# Patient Record
Sex: MALE | Race: WHITE
[De-identification: names, ages, dates, MRNs, and addresses within clinical notes are randomized per-mention and may not be internally consistent; named-entity substitution may affect disease eponyms.]

---

## 2019-12-09 LAB
ANION GAP SERPL CALC-SCNC: 15.2 MMOL/L (ref 8–16)
APTT BLD: 51.1 SECONDS (ref 22.8–39.4)
BASOPHILS NFR BLD AUTO: 0.3 % (ref 0–2)
BUN SERPL-MCNC: 44 MG/DL (ref 7–18)
CALCIUM SERPL-MCNC: 9.1 MG/DL (ref 8.5–10.1)
CHLORIDE SERPL-SCNC: 107 MMOL/L (ref 98–107)
CO2 SERPL-SCNC: 25.7 MMOL/L (ref 21–32)
CREAT SERPL-MCNC: 3.8 MG/DL (ref 0.6–1.3)
EOSINOPHIL NFR BLD: 2.9 % (ref 0–7)
ERYTHROCYTE [DISTWIDTH] IN BLOOD BY AUTOMATED COUNT: 13.5 % (ref 11.5–14.5)
GLUCOSE SERPL-MCNC: 102 MG/DL (ref 74–106)
HCT VFR BLD CALC: 36.4 % (ref 42–54)
HGB BLD-MCNC: 11.9 G/DL (ref 13.5–17.5)
IMM GRANULOCYTES NFR BLD: 0.3 % (ref 0–5)
INR PPP: 1.22 (ref 0.85–1.17)
LYMPHOCYTES NFR BLD AUTO: 8 % (ref 15–50)
MCH RBC QN AUTO: 30.2 PG (ref 26–34)
MCHC RBC AUTO-ENTMCNC: 32.7 G/DL (ref 31–37)
MCV RBC: 92.4 FL (ref 80–100)
MONOCYTES NFR BLD: 8.6 % (ref 2–11)
NEUTROPHILS NFR BLD AUTO: 79.9 % (ref 40–80)
OSMOLALITY SERPL CALC.SUM OF ELEC: 295 MOSM/KG (ref 275–300)
PLATELET # BLD: 185 10X3/UL (ref 130–400)
PMV BLD AUTO: 9 FL (ref 7.4–10.4)
POTASSIUM SERPL-SCNC: 4.9 MMOL/L (ref 3.5–5.1)
PROTHROMBIN TIME: 14.9 SECONDS (ref 11.6–15)
RBC # BLD AUTO: 3.94 10X6/UL (ref 4.2–6.1)
SODIUM SERPL-SCNC: 143 MMOL/L (ref 136–145)
WBC # BLD AUTO: 11.2 10X3/UL (ref 4.8–10.8)

## 2019-12-10 ENCOUNTER — HOSPITAL ENCOUNTER (OUTPATIENT)
Dept: HOSPITAL 84 - D.OPS | Age: 74
LOS: 1 days | Discharge: HOME | End: 2019-12-11
Attending: UROLOGY
Payer: MEDICARE

## 2019-12-10 VITALS — WEIGHT: 169.35 LBS | BODY MASS INDEX: 25.08 KG/M2 | HEIGHT: 69 IN

## 2019-12-10 VITALS — DIASTOLIC BLOOD PRESSURE: 75 MMHG | SYSTOLIC BLOOD PRESSURE: 158 MMHG

## 2019-12-10 VITALS — SYSTOLIC BLOOD PRESSURE: 161 MMHG | DIASTOLIC BLOOD PRESSURE: 74 MMHG

## 2019-12-10 VITALS — DIASTOLIC BLOOD PRESSURE: 85 MMHG | SYSTOLIC BLOOD PRESSURE: 171 MMHG

## 2019-12-10 DIAGNOSIS — N13.8: ICD-10-CM

## 2019-12-10 DIAGNOSIS — R33.8: Primary | ICD-10-CM

## 2019-12-10 DIAGNOSIS — N40.1: ICD-10-CM

## 2019-12-10 PROCEDURE — 52001 CYSTO W/IRRG&EVAC MLT CLOTS: CPT

## 2019-12-11 VITALS — DIASTOLIC BLOOD PRESSURE: 75 MMHG | SYSTOLIC BLOOD PRESSURE: 170 MMHG

## 2019-12-11 VITALS — SYSTOLIC BLOOD PRESSURE: 166 MMHG | DIASTOLIC BLOOD PRESSURE: 60 MMHG

## 2019-12-11 VITALS — SYSTOLIC BLOOD PRESSURE: 130 MMHG | DIASTOLIC BLOOD PRESSURE: 67 MMHG

## 2019-12-11 VITALS — SYSTOLIC BLOOD PRESSURE: 161 MMHG | DIASTOLIC BLOOD PRESSURE: 74 MMHG

## 2019-12-11 NOTE — OP
PATIENT NAME:  MANDO BLAIR                        MEDICAL RECORD: [id number]
:[birth year]                                             LOCATION:[id number]
                                                         ADMISSION DATE:        
SURGEON:  CONNER PERRY MD              
 
 
DATE OF OPERATION:  12/10/2019
 
SURGEON:  Conner Perry MD
 
ANESTHESIA:  Local anesthetic by Vernon Landeros CRNA.
 
DIAGNOSIS:  Clot urinary retention.
 
PROCEDURE:  Cystoscopy, bladder clot evacuation, Collazo catheter insertion over a
guidewire.
 
FINDINGS:  Bladder blood clots, venous bleeding from the prostate.
 
BLOOD LOSS:  Difficult to estimate.
 
CLINICAL HISTORY:  This is a 74-year-old male with difficulty voiding and
history of urinary retention.  He had the UroLift times 6 placed earlier today. 
He was given Levaquin IV at that time.  Subsequently, he had issues with
hematuria from a very vascular prostate.  I had inserted a 16-Japanese Collazo
catheter at the end of the UroLift procedure.  This catheter clotted off.  The
nurse removed it under my direction.  She then tried to insert a 20-Japanese 3-way
Collazo catheter, but this was not successful.  The patient is very full.  His
bladder was scanned with an ultrasound unit and had over 500 mL of fluid in it. 
The actual volume was about 520 mL.  He is feeling very uncomfortable and I am
going to bring him back to the operating room to declot his bladder.  He had
drank a lot of fluids in the attempt to induce voiding.  Therefore, we will have
to perform this under local anesthetic.
 
DESCRIPTION OF PROCEDURE:  The patient was placed on the OR table.  He was
placed in the lithotomy position.  The Collazo catheter that the nurse had
attempted to place was removed.  It was not very mobile and therefore I suspect
that the balloon had been inflated either in the prostate or the penile urethra.
 The 21-Japanese cystoscope with 30-degree lens was used for visualization.  There
are clots in the urethra and in the bladder.  There is some venous oozing from
the prostatic urethra.  An Inbilin evacuator was used to remove the clots from the
bladder.  I then inserted a Sensor wire through the lumen of the scope into the
bladder.  The scope was then removed, leaving the wire in place.  Over the wire,
a 20-Japanese Venetie IRA tip 3-way Collazo catheter was placed into the bladder.  The
balloon was then inflated with 10 cc of sterile water.  Continuous bladder
irrigation with normal saline was started.  The catheter was put to bag
drainage.  He will be admitted for overnight observation.
 
TRANSINT:[id number] Voice Confirmation ID: [id number] DOCUMENT ID: [id number]
 
 
 
OPERATIVE REPORT                               [id number]    MANDO BLAIR, CONNER GOMEZ MD              
 
 
 
Electronically Signed by CONNER PERRY on 19 at 0834
 
 
 
 
 
 
 
 
 
 
 
 
 
 
 
 
 
 
 
 
 
 
 
 
 
 
 
 
 
 
 
 
 
 
 
 
 
 
 
 
 
CC:                                                             [id number]
DICTATION DATE: 12/10/19 1843     :     12/10/19 2336      [facility] [id number] [place] [address]

## 2019-12-11 NOTE — OP
PATIENT NAME:  MANDO BLAIR                        MEDICAL RECORD: [id number]
:[birth year]                                             LOCATION:[id number]
                                                         ADMISSION DATE:        
SURGEON:  CONNER PERRY MD              
 
 
DATE OF OPERATION:  12/10/2019
 
SURGEON:  Conner Perry MD
 
ANESTHESIA:  TIVA by Conner Vogt MD
 
DIAGNOSES:  History of urinary retention, obstructive benign prostatic
hypertrophy.
 
PROCEDURE:  UroLift times 6.
 
FINDINGS:  Long obstructive lateral lobes and a small median lobe.  No bladder
tumors.  Single ureteral orifices bilaterally.
 
ESTIMATED BLOOD LOSS:  Minimal.
 
CLINICAL HISTORY:  This is a 74-year-old male, who had urinary retention after
an orthopedic procedure.  The catheter was not easily inserted and he had to
have a catheter inserted under direct cystoscopy.  During cystoscopy, he was
noted to have very large lateral lobes, which were long and obstructive.  There
was also a small median lobe.  He comes today to have the UroLift procedure done
to relieve the obstruction.  He was given Ancef on-call to the OR.
 
DESCRIPTION OF PROCEDURE:  The patient was given IV sedation.  He was placed in
the lithotomy position and prepped and draped.  The Urolift scope was
introduced.  Cystoscopic findings are as outlined above.  At 1.5 cm distal to
the bladder neck, the first 2 implants were placed at the anterolateral sulcus. 
One unit was then implanted on each side, then at the level of the verumontanum,
1 unit was placed on each side at the level of the anterolateral sulcus. 
Looking in with the visual obturator, there was still obstruction in the mid
prostatic urethra.  Therefore, in the mid urethra of the mid prostatic urethra,
we placed 1 more unit on each side.  This gave a total of 6 implanted units. 
Now he has nice open channel for voiding.  The prostate is extremely vascular
and there was significant bleeding from the placement of these implants.  This
is venous bleeding.  I placed a 16-French Collazo catheter into the patient.  The
balloon was inflated with 10 cc of sterile water.
 
I will see the patient in followup on Friday to remove the catheter for a
voiding trial.
 
TRANSINT:[id number] Voice Confirmation ID: [id number] DOCUMENT ID: [id number]
                                           
                                           CONNER PERRY MD              
 
 
 
Electronically Signed by CONNER PERRY on 19 at 0834
CC:                                                             [id number]
DICTATION DATE: 12/10/19 1522     :     12/10/19 1927      [facility]                                          
 [place] [address]

## 2020-01-22 ENCOUNTER — HOSPITAL ENCOUNTER (OUTPATIENT)
Dept: HOSPITAL 84 - D.LABREF | Age: 75
Discharge: HOME | End: 2020-01-22
Attending: UROLOGY
Payer: MEDICARE

## 2020-01-22 VITALS — BODY MASS INDEX: 25 KG/M2

## 2020-01-22 DIAGNOSIS — R82.90: Primary | ICD-10-CM

## 2020-01-22 DIAGNOSIS — R31.9: ICD-10-CM

## 2020-10-07 ENCOUNTER — HOSPITAL ENCOUNTER (OUTPATIENT)
Dept: HOSPITAL 84 - D.LABREF | Age: 75
Discharge: HOME | End: 2020-10-07
Attending: UROLOGY
Payer: MEDICARE

## 2020-10-07 VITALS — BODY MASS INDEX: 25 KG/M2

## 2020-10-07 DIAGNOSIS — N39.0: Primary | ICD-10-CM
